# Patient Record
Sex: FEMALE | Race: BLACK OR AFRICAN AMERICAN | Employment: UNEMPLOYED | URBAN - METROPOLITAN AREA
[De-identification: names, ages, dates, MRNs, and addresses within clinical notes are randomized per-mention and may not be internally consistent; named-entity substitution may affect disease eponyms.]

---

## 2022-02-22 ENCOUNTER — APPOINTMENT (OUTPATIENT)
Dept: GENERAL RADIOLOGY | Age: 67
End: 2022-02-22
Payer: MEDICARE

## 2022-02-22 ENCOUNTER — HOSPITAL ENCOUNTER (EMERGENCY)
Age: 67
Discharge: HOME | End: 2022-02-22
Payer: MEDICARE

## 2022-02-22 VITALS
HEART RATE: 78 BPM | BODY MASS INDEX: 27.68 KG/M2 | WEIGHT: 171.52 LBS | RESPIRATION RATE: 16 BRPM | SYSTOLIC BLOOD PRESSURE: 133 MMHG | TEMPERATURE: 98.8 F | DIASTOLIC BLOOD PRESSURE: 94 MMHG | OXYGEN SATURATION: 99 %

## 2022-02-22 DIAGNOSIS — S82.002A CLOSED DISPLACED FRACTURE OF LEFT PATELLA, UNSPECIFIED FRACTURE MORPHOLOGY, INITIAL ENCOUNTER: Primary | ICD-10-CM

## 2022-02-22 PROCEDURE — 73560 X-RAY EXAM OF KNEE 1 OR 2: CPT

## 2022-02-22 PROCEDURE — 99284 EMERGENCY DEPT VISIT MOD MDM: CPT

## 2022-02-22 PROCEDURE — 6370000000 HC RX 637 (ALT 250 FOR IP): Performed by: NURSE PRACTITIONER

## 2022-02-22 RX ORDER — ACETAMINOPHEN 500 MG
1000 TABLET ORAL ONCE
Status: COMPLETED | OUTPATIENT
Start: 2022-02-22 | End: 2022-02-22

## 2022-02-22 RX ORDER — IBUPROFEN 600 MG/1
600 TABLET ORAL EVERY 6 HOURS PRN
Qty: 30 TABLET | Refills: 0 | Status: SHIPPED | OUTPATIENT
Start: 2022-02-22 | End: 2022-02-27

## 2022-02-22 RX ORDER — IBUPROFEN 400 MG/1
800 TABLET ORAL ONCE
Status: COMPLETED | OUTPATIENT
Start: 2022-02-22 | End: 2022-02-22

## 2022-02-22 RX ORDER — ACETAMINOPHEN 500 MG
500 TABLET ORAL 4 TIMES DAILY PRN
Qty: 28 TABLET | Refills: 0 | Status: SHIPPED | OUTPATIENT
Start: 2022-02-22 | End: 2022-03-01

## 2022-02-22 RX ADMIN — IBUPROFEN 800 MG: 400 TABLET, FILM COATED ORAL at 11:58

## 2022-02-22 RX ADMIN — ACETAMINOPHEN 1000 MG: 500 TABLET ORAL at 11:58

## 2022-02-22 ASSESSMENT — PAIN SCALES - GENERAL
PAINLEVEL_OUTOF10: 5
PAINLEVEL_OUTOF10: 7
PAINLEVEL_OUTOF10: 8

## 2022-02-22 ASSESSMENT — PAIN DESCRIPTION - PAIN TYPE: TYPE: ACUTE PAIN

## 2022-02-22 ASSESSMENT — PAIN DESCRIPTION - DESCRIPTORS: DESCRIPTORS: DISCOMFORT

## 2022-02-22 ASSESSMENT — PAIN DESCRIPTION - LOCATION: LOCATION: GENERALIZED

## 2022-02-22 NOTE — ED PROVIDER NOTES
[address] [facility]  [address] [address]  Dept: [phone]  Loc: [address] ENCOUNTER        This patient was not seen or evaluated by the attending physician. I evaluated this patient, the attending physician was available for consultation. CHIEF COMPLAINT    Chief Complaint   Patient presents with    Fall     pt was at dentist today. tripped in office. pain in left knee       HPI    Karla Mckenzie is a 78 y.o. female who presents with fall that occurred approximately 1 hour [email or web address]. The context was patient was at her dentist office when she experienced a mechanical slip and fall witnessed by her dental assistant. There was reportedly no head injury or loss of consciousness. The patient complains of pain located in the left knee, left ankle, and left foot. The severity of the pain is 8/10. The pain of the joints are described as \"aching\" the pain is aggravated by movement. Patient has no associated complaints. She denies nausea, vomiting, dizziness, chest pain, shortness of breath, or other prodrome prior to or since falling.     REVIEW OF SYSTEMS    Neurologic: No LOC, denies head injury  Cardiac: No Chest Pain, no syncope  Respiratory: No difficulty breathing  GI: No abdominal pain  Musculoskeletal: see HPI      PAST MEDICAL & SURGICAL HISTORY    Past Medical History:   Diagnosis Date    Arthritis     High blood pressure      Past Surgical History:   Procedure Laterality Date    HYSTERECTOMY         CURRENT MEDICATIONS  (may include discharge medications prescribed in the ED)      ALLERGIES    Allergies   Allergen Reactions    Other      IVP Dye       SOCIAL & FAMILY HISTORY    Social History     Socioeconomic History    Marital status:      Spouse name: Not on file    Number of children: Not on file    Years of education: Not on file    Highest education level: Not on file Occupational History    Occupation: grants writer   Tobacco Use    Smoking status: Never Smoker    Smokeless tobacco: Not on file   Substance and Sexual Activity    Alcohol use: No    Drug use: No    Sexual activity: Not on file   Other Topics Concern    Not on file   Social History Narrative    Not on file     Social Determinants of Health     Financial Resource Strain:     Difficulty of Paying Living Expenses: Not on file   Food Insecurity:     Worried About Running Out of Food in the Last Year: Not on file    Ruth of Food in the Last Year: Not on file   Transportation Needs:     Lack of Transportation (Medical): Not on file    Lack of Transportation (Non-Medical):  Not on file   Physical Activity:     Days of Exercise per Week: Not on file    Minutes of Exercise per Session: Not on file   Stress:     Feeling of Stress : Not on file   Social Connections:     Frequency of Communication with Friends and Family: Not on file    Frequency of Social Gatherings with Friends and Family: Not on file    Attends Baptism Services: Not on file    Active Member of [address] [place] [address] or Organizations: Not on file    Attends Club or Organization Meetings: Not on file    Marital Status: Not on file   Intimate Partner Violence:     Fear of Current or Ex-Partner: Not on file    Emotionally Abused: Not on file    Physically Abused: Not on file    Sexually Abused: Not on file   Housing Stability:     Unable to Pay for Housing in the Last Year: Not on file    Number of [place] in the Last Year: Not on file    Unstable Housing in the Last Year: Not on file     Family History   Problem Relation Age of Onset    Arthritis Unknown     Cancer Unknown     Diabetes Unknown     Heart Disease Unknown     High Blood Pressure Unknown        PHYSICAL EXAM    VITAL SIGNS: BP (!) 164/100   Pulse 72   Temp 98.8 °F (37.1 °C) (Tympanic)   Resp 17   Wt 171 lb 8.3 oz (77.8 kg)   SpO2 99%   BMI 27.68 kg/m² Constitutional:  Well developed, well nourished, no acute distress   HENT:  atraumatic, no trismus  Neck: supple, no JVD, no posterior neck tenderness  Respiratory:  Lungs clear to auscultation bilaterally, no retractions   Cardiovascular:  regular rate and rhythm, no murmurs, rubs, or gallops.  + S1-S2. GI:  Soft, nontender, normal bowel sounds  Musculoskeletal: + Edema, + Limited ROM of the left knee. Vascular: Left DP/PT pulses 2+ with brisk cap refill  Integument:  Well hydrated, without abrasions or lacerations. Neurologic:  Alert & oriented, no slurred speech  Psych: Pleasant affect, no hallucinations    RADIOLOGY  (interpreted by the radiologist)  XR KNEE LEFT (1-2 VIEWS)    Result Date: 2/22/2022  Minimally displaced fracture of the mid patella. ED COURSE & MEDICAL DECISION MAKING    Pertinent studies reviewed and interpreted. (See chart for details)  See chart for details of medications ordered    Differential Diagnosis: Fracture, Dislocation, ligamentous injury, other soft tissue injury, visceral injury, neurologic injury, other. Patient presents emerged department following a mechanical fall with complaints of left knee, ankle, and foot pain. Given that she is an advancing age and fell with left-sided impact I want to obtain x-rays of her bilateral hips as well as left ankle, knee, and foot. Patient was medicated for pain with ibuprofen and Tylenol. Ice pack was applied. Extremity was elevated. I am informed by the XRT that the patient refuses all imaging with the exception of her left knee as \"my knee is only thing that hurts\". I discussed with the patient my reasoning for wanting to image the other joints. The patient declines additional imaging. Given that the patient has right to self-determination as she is mentally competent I obtain imaging of only the left knee. XR left knee pending. XR left knee: Identifies a minimally displaced fracture of the mid patella.   Therefore the patient's left knee was Ace wrapped and she placed in a knee immobilizer. She will utilize RICE as well as prescribed analgesics. She will utilize own walker at home during ambulation. Strict return precautions. She is instructed to follow-up by calling tomorrow to schedule an appointment with the orthopedic specialist.  Patient verbalizes understanding is agreeable to plan for discharge and follow-up.       FINAL IMPRESSION    Closed displaced fracture of left patella    PLAN  Discharge with outpatient instructions and follow-up (See EMR)      (Please note that this note was completed with a voice recognition program.  Every attempt was made to edit the dictations, but inevitably there remain words that are mis-transcribed.)            Delano Ruvalcaba, MARLY - CNP  02/24/22 [id number]

## 2022-02-23 ENCOUNTER — TELEPHONE (OUTPATIENT)
Dept: ORTHOPEDIC SURGERY | Age: 67
End: 2022-02-23

## 2022-02-28 ENCOUNTER — TELEPHONE (OUTPATIENT)
Dept: ORTHOPEDIC SURGERY | Age: 67
End: 2022-02-28

## 2022-03-10 ENCOUNTER — TELEPHONE (OUTPATIENT)
Dept: ORTHOPEDIC SURGERY | Age: 67
End: 2022-03-10